# Patient Record
Sex: MALE | Race: BLACK OR AFRICAN AMERICAN | Employment: UNEMPLOYED | URBAN - METROPOLITAN AREA
[De-identification: names, ages, dates, MRNs, and addresses within clinical notes are randomized per-mention and may not be internally consistent; named-entity substitution may affect disease eponyms.]

---

## 2023-12-24 ENCOUNTER — APPOINTMENT (OUTPATIENT)
Dept: ULTRASOUND IMAGING | Facility: HOSPITAL | Age: 28
End: 2023-12-24
Attending: EMERGENCY MEDICINE

## 2023-12-24 ENCOUNTER — HOSPITAL ENCOUNTER (EMERGENCY)
Facility: HOSPITAL | Age: 28
Discharge: HOME | End: 2023-12-24
Attending: EMERGENCY MEDICINE

## 2023-12-24 VITALS
TEMPERATURE: 98 F | DIASTOLIC BLOOD PRESSURE: 81 MMHG | HEIGHT: 68 IN | RESPIRATION RATE: 18 BRPM | BODY MASS INDEX: 19.7 KG/M2 | OXYGEN SATURATION: 100 % | SYSTOLIC BLOOD PRESSURE: 127 MMHG | WEIGHT: 130 LBS | HEART RATE: 75 BPM

## 2023-12-24 DIAGNOSIS — N50.89 MASS OF RIGHT TESTICLE: ICD-10-CM

## 2023-12-24 DIAGNOSIS — N50.811 PAIN IN RIGHT TESTICLE: ICD-10-CM

## 2023-12-24 DIAGNOSIS — N30.01 ACUTE CYSTITIS WITH HEMATURIA: Primary | ICD-10-CM

## 2023-12-24 LAB
AFP-TM SERPL-MCNC: 3.8 NG/ML (ref ?–8)
B-HCG SERPL-ACNC: <1 MIU/ML
BILIRUB UR QL STRIP.AUTO: NEGATIVE
CLARITY UR REFRACT.AUTO: CLEAR
GLUCOSE UR STRIP.AUTO-MCNC: NORMAL MG/DL
KETONES UR STRIP.AUTO-MCNC: NEGATIVE MG/DL
LDH SERPL L TO P-CCNC: 98 U/L
LEUKOCYTE ESTERASE UR QL STRIP.AUTO: 250
NITRITE UR QL STRIP.AUTO: NEGATIVE
PH UR STRIP.AUTO: 7.5 [PH] (ref 5–8)
RBC UR QL AUTO: NEGATIVE
SP GR UR STRIP.AUTO: 1.02 (ref 1–1.03)
UROBILINOGEN UR STRIP.AUTO-MCNC: NORMAL MG/DL

## 2023-12-24 PROCEDURE — 96372 THER/PROPH/DIAG INJ SC/IM: CPT

## 2023-12-24 PROCEDURE — 99284 EMERGENCY DEPT VISIT MOD MDM: CPT

## 2023-12-24 PROCEDURE — 84702 CHORIONIC GONADOTROPIN TEST: CPT | Performed by: EMERGENCY MEDICINE

## 2023-12-24 PROCEDURE — 87491 CHLMYD TRACH DNA AMP PROBE: CPT | Performed by: EMERGENCY MEDICINE

## 2023-12-24 PROCEDURE — 87591 N.GONORRHOEAE DNA AMP PROB: CPT | Performed by: EMERGENCY MEDICINE

## 2023-12-24 PROCEDURE — 82105 ALPHA-FETOPROTEIN SERUM: CPT | Performed by: EMERGENCY MEDICINE

## 2023-12-24 PROCEDURE — 93975 VASCULAR STUDY: CPT | Performed by: EMERGENCY MEDICINE

## 2023-12-24 PROCEDURE — 81001 URINALYSIS AUTO W/SCOPE: CPT | Performed by: EMERGENCY MEDICINE

## 2023-12-24 PROCEDURE — 76870 US EXAM SCROTUM: CPT | Performed by: EMERGENCY MEDICINE

## 2023-12-24 PROCEDURE — 99285 EMERGENCY DEPT VISIT HI MDM: CPT

## 2023-12-24 PROCEDURE — 87086 URINE CULTURE/COLONY COUNT: CPT | Performed by: EMERGENCY MEDICINE

## 2023-12-24 PROCEDURE — 83615 LACTATE (LD) (LDH) ENZYME: CPT | Performed by: EMERGENCY MEDICINE

## 2023-12-24 RX ORDER — AZITHROMYCIN 250 MG/1
1000 TABLET, FILM COATED ORAL ONCE
Status: COMPLETED | OUTPATIENT
Start: 2023-12-24 | End: 2023-12-24

## 2023-12-24 RX ORDER — LEVOFLOXACIN 500 MG/1
500 TABLET, FILM COATED ORAL DAILY
Qty: 7 TABLET | Refills: 0 | Status: SHIPPED | OUTPATIENT
Start: 2023-12-24 | End: 2023-12-31

## 2023-12-24 RX ORDER — DOXYCYCLINE HYCLATE 100 MG/1
100 CAPSULE ORAL 2 TIMES DAILY
Qty: 14 CAPSULE | Refills: 0 | Status: SHIPPED | OUTPATIENT
Start: 2023-12-24 | End: 2023-12-31

## 2023-12-24 NOTE — ED INITIAL ASSESSMENT (HPI)
Pt here due to right testicle swelling. Pt stated that when he woke up he noticed that it was swollen and he is also having groin pain. Pt does admit to unprotected sex recently.

## 2023-12-24 NOTE — DISCHARGE INSTRUCTIONS
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 Oftentimes these are adenomatoid  tumors but this is not confirmed until surgery. Adenomatoid tumors are rare and benign mesothelial tumors, which arise from the lining of organs. It mainly presents in the genital tract, in regions such as the testis[1] and epididymis. [2] Because of this, researchers had a difficult time concluding that type of tumor has a mesothelial origin. Immunohistochemistry staining of tumor samples show that it is indeed positive for mesothelial-markers (calretinin, WT1, and CK6). [3] It is the most common extratesticular neoplasm after lipoma, and accounts for 30% of these masses. [4] On the other hand, adenomatoid tumors are the most common tumors of testicular adnexa. Although they are more common to be found in the paratesticular region they are sometimes found in the intratesticular region. It also has been found in other organs such as the pancreas,[5] liver,[6] mesocolon,[6] and adrenal glands. [6] In the female, it has been found in the body of the uterus and the fallopian tube. [7] Most adenomatoid tumors do not cause much pain and can go unnoticed for a long time. Of course, there are a few exceptions to this absence of pain. An example of this is when adenomatoid tumors grow too close to testicular adnexal structures. [8] Tumors of this kind are usually found to be asymptomatic and easily treatable. Pathology  While adenomatoid tumors are present in both the various body areas of males and females, one study discovered that these tumors were more likely to occur in  males. Adenomatoid tumors can be found in a widespread age of people ranging from 25to 78years old. [3] Most of these tumors are not spotted until about the 30s. This asymptomatic nature is due to the fact that a majority of adenomatoid tumors grow silently and are sometimes dormant without causing any pain.  This is what contributes to the difficulty of catching adenomatoid tumors. These benign tumors can grow up to 5.0 centimeters (most of them range around to 2.0 cm). Although adenomatoid tumors are not known to infiltrate surrounding tissues, it is possible that there are multiple adenomatoid tumors in the same area. An adenomatoid tumor of the fallopian tube, low magnification, displaying infiltrative-like borders. An adenomatoid tumor of the fallopian tube, low magnification, displaying infiltrative-like borders. High magnification of the same case, showing the typical[9] features of tubular spaces of varying size composed of flattened cells resembling endothelium. High magnification of the same case, showing the typical[9] features of tubular spaces of varying size composed of flattened cells resembling endothelium. Ancillary Studies  Immunohistochemistry  Given their mesothelial origin, the cells lining the spaces are positive for cytokeratins, WT1, D2-40, and calretinin. They are negative for MOC31, BerEP4, and vascular markers (CD31 or CD34). BAP1 expression is retained. L1 cell adhesion molecule (L1CAM), a marker of NF-kB pathway activation, has been shown to be expressed in adenomatoid tumors (and negative in other benign mesothelial tissues and mesothelioma). [10]    Molecular analysis  Adenomatoid tumors of the female and male genital tract are characterized by somatic missense mutations in the TRAF7 gene, which leads to aberrant NF-kB pathway activation. Unlike some malignant mesotheliomas, adenomatoid tumors do not harbor CDKN2A deleterious mutations. [10]    Imaging  In order to see adenomatoid tumors multiple imaging modalities are used. These tumors appear to be small, solid, and circumscribed with a color that usually ranges from white to tan. [6]    Ultrasound, MRI, and CT scans are all used to identify whether a patient has adenomatoid tumors. The shape, location, and activity of the tumor are all important information to attain.  Each imaging modality has its own strengths and weaknesses. Dynamic contrast enhanced MRI can differentiate tumors from disorders in the testicular region. Misdiagnosing something can sometimes cause more damage then doing nothing. One weakness of adenomatoid tumor ultrasound imaging is that it is only possible if the tumor is hyperechoic. Ultrasonically can help identify if it fits this category or not. This specific characteristic in adenomatoid tumors is variable and can determine whether or not ultrasound is the right technique for the job. [11] Having other options available to identify the tumor helps fill in the gap when ultrasound is not possible. MRI and CT imaging are usually helpful when scanning the adrenal gland for tumors. [12]    Treatment  If located in the testes region, removal of the tumor must be handled with great care to prevent damage to testosterone production and keep the patient fertile. If removal of the tumor is necessary then an excision biopsy is usually done on the patient. Though this can be used as both a diagnostic and removal procedure, it has proven to be effective. Successful procedures results in a tumor free area that is not prone recur. [6]    When used as a diagnosis tool, the excision biopsy can collect a tissue sample from the tumor. Through histological evaluation the lesion can be categorized in the patients.

## 2023-12-26 LAB
C TRACH DNA SPEC QL NAA+PROBE: POSITIVE
N GONORRHOEA DNA SPEC QL NAA+PROBE: POSITIVE

## 2023-12-27 NOTE — PROGRESS NOTES
ED Culture Callback Results Review    Pharmacist reviewed culture results from ED visit .    Final urine culture positive for chlamydia and gonorrhea, appropriately treated with ceftriaxone 1000 mg IM x1 dose in the ED and a prescription for doxycycline 100 mg PO BID x7 days.    Patient was contacted and informed of the results. Patient was educated to inform all sexual partners from the previous 60-days prior to symptom onset of patient’s positive status and to abstain from sexual activity for 7 days after the end of therapy to prevent further spread. Patient verbalized understanding and all questions were answered. No further intervention required at this time.     Myron Kerns, PharmD  Emergency Medicine Pharmacist Specialist  12/26/23; 8:16 PM